# Patient Record
Sex: FEMALE | URBAN - METROPOLITAN AREA
[De-identification: names, ages, dates, MRNs, and addresses within clinical notes are randomized per-mention and may not be internally consistent; named-entity substitution may affect disease eponyms.]

---

## 2022-09-15 ENCOUNTER — TELEPHONE (OUTPATIENT)
Dept: FAMILY PLANNING/WOMEN'S HEALTH CLINIC | Age: 47
End: 2022-09-15

## 2022-09-15 NOTE — TELEPHONE ENCOUNTER
Called patient to screen for EWL program. Patient did not answer and voicemail was left with information on how to be screened for the program.

## 2022-09-20 ENCOUNTER — TELEPHONE (OUTPATIENT)
Dept: FAMILY PLANNING/WOMEN'S HEALTH CLINIC | Age: 47
End: 2022-09-20

## 2022-09-20 NOTE — TELEPHONE ENCOUNTER
Patient referred by [facility]. Spoke with patient on phone to screen for EWL. Overview of program given. Eligibility criteria reviewed. Client does meet all qualifications and does want to enter into program. Patient has been scheduled for EWL appointment. Pap was done 2021 per patient.

## 2022-10-31 ENCOUNTER — TRANSCRIBE ORDER (OUTPATIENT)
Dept: SCHEDULING | Age: 47
End: 2022-10-31

## 2022-10-31 ENCOUNTER — TELEPHONE (OUTPATIENT)
Dept: FAMILY PLANNING/WOMEN'S HEALTH CLINIC | Age: 47
End: 2022-10-31

## 2022-10-31 DIAGNOSIS — Z12.31 VISIT FOR SCREENING MAMMOGRAM: Primary | ICD-10-CM

## 2022-10-31 NOTE — TELEPHONE ENCOUNTER
Patient was called to confirm that she will be attending upcoming [facility] appointment at [facility] . Patient confirmed she will be there with no concerns.

## 2022-11-02 ENCOUNTER — TRANSCRIBE ORDER (OUTPATIENT)
Dept: MAMMOGRAPHY | Age: 47
End: 2022-11-02

## 2022-11-02 ENCOUNTER — HOSPITAL ENCOUNTER (OUTPATIENT)
Dept: MAMMOGRAPHY | Age: 47
Discharge: HOME | End: 2022-11-02

## 2022-11-02 ENCOUNTER — OFFICE VISIT (OUTPATIENT)
Dept: FAMILY PLANNING/WOMEN'S HEALTH CLINIC | Age: 47
End: 2022-11-02

## 2022-11-02 DIAGNOSIS — Z01.419 ENCOUNTER FOR WELL WOMAN EXAM: Primary | ICD-10-CM

## 2022-11-02 DIAGNOSIS — Z12.39 SCREENING FOR BREAST CANCER: ICD-10-CM

## 2022-11-02 DIAGNOSIS — Z12.39 SCREENING FOR BREAST CANCER: Primary | ICD-10-CM

## 2022-11-02 PROCEDURE — 77063 BREAST TOMOSYNTHESIS BI: CPT

## 2022-11-02 NOTE — PROGRESS NOTES
Assessment/Plan:    Diagnoses and all orders for this visit:    1. Encounter for well woman exam      3D for dense breast tissue on exam      YOLANDA Johansen expressed understanding of this plan. An AVS was printed and given to the patient.      ----------------------------------------------------------------------    Chief Complaint   Patient presents with    Well Woman       History of Present Illness:  EWL first visit, she had a previous mammo in 2017 at [facility] which was reported to be benign  , having periods, not using birth control  , no risk of DV  No breast concerns or complaints  She is UTD on pap through the [facility]      No past medical history on file. No Known Allergies    Social History     Tobacco Use    Smoking status: Never    Smokeless tobacco: Never       No family history on file. Physical Exam:     Visit Vitals  LMP 10/16/2022       A&Ox3  WDWN NAD  Respirations normal and non labored  Breast exam- lindsey neg for mass, tenderness, skin color changes, dimpling or retractions.  Dense tissue on exam warrants 3D

## 2022-11-02 NOTE — PROGRESS NOTES
EVERY WOMANS LIFE HISTORY QUESTIONNAIRE       No Yes Comments   Has a doctor ever seen or felt anything wrong with your breast? []                                  [x]                                  Per pt she felt something to right breast and went for mammogram. Everything was normal. They told pt it would clear by itself and it did but it was painful. Have you ever had a breast biopsy? [x]                                  []                                          When and where was last mammogram performed? 2017 [facility].    Have you ever been told that there was a problem on your mammogram?   No Yes Comments   [x]                                  []                                       Do you have breast implants? No Yes Comments   [x]                                  []                                       When was your last Pap test performed? [facility] 2021 last pap. Pt declined [facility] pap services    Have you ever had an abnormal Pap test?   No Yes Comments   []                                  [x]                                  [facility] per pt her ph was abnormal     Have you had a hysterectomy? No Yes Comments (why)   [x]                                  []                                       Have you been through menopause? No Yes Date of LMP   [x]                                  []                                  10/16/22     Did your mother take JEM? No Yes Unknown   [x]                                  []                                       Do you have a history of HIV exposure? No Yes    [x]                                  []                                       Have you ever been diagnosed with any type of Cancer   No Yes Comments (type,when,where,type of treatment   [x]                                  []                                          Has a family member been diagnosed with breast or ovarian cancer?    No Yes Comments (which family members, and type   [x] []                                       Are you taking hormone replacement therapy (HRT)     No Yes Comments   [x]                                  []                                       How many times have you been pregnant? 7       Number of live births ? 5    Are you experiencing any of the following? No Yes Comments   Nipple Discharge [x]                                  []                                     Breast Lump/Masses [x]                                  []                                     Breast Skin Changes [x]                                  []                                          No Yes Comments   Vaginal Discharge [x]                                  []                                     Abnormal/unusual vaginal bleeding [x]                                  []                                         Are you experiencing any other health problems? PT's mother diagnosed with uterine cancer. She says they are currently dealing with being told their isn't anymore they can do for her. In 2004 pt diagnosed \"KENAN 1\" and having freezing procedure done to her cervix. Pt was told it was precancerous. Done in [place]. Age at first period? 17  Age at first birth? 18    Ht--5'2\"    Wt--180lbs    [facility]  number is 1.

## 2023-10-09 PROBLEM — K21.9 GASTROESOPHAGEAL REFLUX DISEASE WITHOUT ESOPHAGITIS: Status: ACTIVE | Noted: 2023-10-09

## 2023-10-09 PROBLEM — Z80.0 FAMILY HISTORY OF COLON CANCER: Status: ACTIVE | Noted: 2023-10-09

## 2023-10-16 ENCOUNTER — TRANSCRIBE ORDERS (OUTPATIENT)
Facility: HOSPITAL | Age: 48
End: 2023-10-16

## 2023-10-16 DIAGNOSIS — Z12.31 VISIT FOR SCREENING MAMMOGRAM: Primary | ICD-10-CM

## 2023-12-26 ENCOUNTER — TELEPHONE (OUTPATIENT)
Age: 48
End: 2023-12-26

## 2023-12-26 NOTE — TELEPHONE ENCOUNTER
Called patient to remind of upcoming appointment with [facility] Every Woman's Life program on 1/2/2024- no answer. Left message with appt. Details and EWL hotline in case that she wants to cancel/reschedule.  Selena Bailey

## 2024-01-02 ENCOUNTER — HOSPITAL ENCOUNTER (OUTPATIENT)
Facility: HOSPITAL | Age: 49
Discharge: HOME | End: 2024-01-05

## 2024-01-02 ENCOUNTER — OFFICE VISIT (OUTPATIENT)
Age: 49
End: 2024-01-02

## 2024-01-02 DIAGNOSIS — Z01.419 ENCOUNTER FOR WELL WOMAN EXAM: Primary | ICD-10-CM

## 2024-01-02 DIAGNOSIS — Z12.31 VISIT FOR SCREENING MAMMOGRAM: ICD-10-CM

## 2024-01-02 PROCEDURE — 77063 BREAST TOMOSYNTHESIS BI: CPT

## 2024-01-02 NOTE — PROGRESS NOTES
EVERY WOMANS LIFE HISTORY QUESTIONNAIRE       No Yes Comments   Has a doctor ever seen or felt anything wrong with your breast? [x]                                  []                                     Have you ever had a breast biopsy? [x]                                  []                                          When and where was last mammogram performed?  11/2/2022 [facility]    Have you ever been told that there was a problem on your mammogram?   No Yes Comments   [x]                                  []                                       Do you have breast implants?   No Yes Comments   [x]                                  []                                       When was your last Pap test performed? 12/29/2023 [facility]. Pt declined [facility] pap.    Have you ever had an abnormal Pap test?   No Yes Comments   []                                  [x]                                  2019 [facility] but recent pap was normal     Have you had a hysterectomy?   No Yes Comments (why)   [x]                                  []                                       Have you been through menopause?   No Yes Date of LMP   [x]                                  []                                  12/4/2023     Did your mother take BROOKLYNN?  No Yes Unknown   [x]                                  []                                       Do you have a history of HIV exposure?  No Yes    [x]                                  []                                       Have you ever been diagnosed with any type of Cancer   No Yes Comments (type,when,where,type of treatment   []                                  [x]                                  Pt put info under question if pt had cancer=2004 pt had done in [place]. 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 Pt had cryotherapy to remove and had no other issues.         Has a family member been diagnosed with breast or ovarian cancer?   No Yes Comments (which

## 2024-01-02 NOTE — PROGRESS NOTES
Assessment/Plan:    Dionne was seen today for ewl.    Diagnoses and all orders for this visit:    Encounter for well woman exam        No follow-up provider specified.    Lorin Craig PA-C  Dionne JohnSeth expressed understanding of this plan. An AVS was printed and given to the patient.      ----------------------------------------------------------------------    Chief Complaint   Patient presents with    EWL       History of Present Illness:  EWL annual well woman exam   having periods, normal each month  No breast concerns or complaints  UTD on pap through PCP at [facility]  No risk for DV       Past Medical History:   Diagnosis Date    Hypertension        Current Outpatient Medications   Medication Sig Dispense Refill    acetaminophen (TYLENOL) 325 MG tablet Take 2 tablets by mouth every 6 hours as needed for Pain      ferrous sulfate (IRON 325) 325 (65 Fe) MG tablet Take 1 tablet by mouth daily (with breakfast)      vitamin C (ASCORBIC ACID) 500 MG tablet Take 1 tablet by mouth daily       No current facility-administered medications for this visit.       No Known Allergies    Social History     Tobacco Use    Smoking status: Never     Passive exposure: Never    Smokeless tobacco: Never   Substance Use Topics    Alcohol use: Not Currently       Family History   Problem Relation Age of Onset    Colon Cancer Mother     Heart Attack Father        Physical Exam:     LMP 2023     A&Ox3  WDWN NAD  Respirations normal and non labored  Breast exam- alice neg for mass, tenderness, skin color changes, dimpling or retractions

## 2025-01-23 ENCOUNTER — HOSPITAL ENCOUNTER (OUTPATIENT)
Facility: HOSPITAL | Age: 50
Discharge: HOME | End: 2025-01-26

## 2025-01-23 ENCOUNTER — NURSE ONLY (OUTPATIENT)
Age: 50
End: 2025-01-23

## 2025-01-23 DIAGNOSIS — Z71.89 COUNSELING AND COORDINATION OF CARE: Primary | ICD-10-CM

## 2025-01-23 DIAGNOSIS — Z12.31 VISIT FOR SCREENING MAMMOGRAM: ICD-10-CM

## 2025-01-23 PROCEDURE — 77063 BREAST TOMOSYNTHESIS BI: CPT

## 2025-01-23 NOTE — PROGRESS NOTES
Patient seen as a nurse visit today for screening mammogram. Pt showed up to the breast imaging suite, pt does not have insurance, was a previous [facility] Life participant. Would like to be enrolled in the program for this year as well. Pt eligibility completed and meets qualifications. Pt goes to [facility], sees Gladys Laurent NP. Pt's mammogram will be covered through with [facility].     EVERY WOMANS LIFE HISTORY QUESTIONNAIRE     used  [] No    [x]   Yes ( this nurse spoke in [place] with patient)    Ht-- 5'2 inches    Wt-- 180 llbs    Do you have any breast concerns today?  ____None reported    Are you experiencing any of the following?   No Yes Comments   Nipple Discharge [x]                                  []                                     Breast Lump/Masses [x]                                  []                                     Breast Skin Changes [x]                                  []                                           When and where was last mammogram performed?  ____01/2024- [facility]     No Yes Comments   Have you ever had a mammogram that required additional follow up?  [x]     []        Have you ever had a breast biopsy? [x]                                  []                                     Do you have breast implants?  [x]        []                When and where was your last Pap test performed? ____2023 [facility]- pt states that she has an appt. With [facility] on 2/6/25 for a pap smear. And declined pelvic/cervical screening with program.     Have you ever had an abnormal Pap test? ( Please note, if Hx of Colposcopy, LEEP, CKC, JOSÉ MIGUEL 2 or 3)  No Yes Comments   []                                  [x]                                  2004 had cryotheraphy in [place]- JOSÉ MIGUEL 1??     Have you been through menopause?   No Yes Date of LMP   [x]                                  []                                  1/15/2025       Have you had